# Patient Record
Sex: MALE | Race: OTHER | HISPANIC OR LATINO | URBAN - METROPOLITAN AREA
[De-identification: names, ages, dates, MRNs, and addresses within clinical notes are randomized per-mention and may not be internally consistent; named-entity substitution may affect disease eponyms.]

---

## 2023-07-03 ENCOUNTER — EMERGENCY (EMERGENCY)
Facility: HOSPITAL | Age: 33
LOS: 1 days | Discharge: HOME | End: 2023-07-03
Attending: EMERGENCY MEDICINE | Admitting: EMERGENCY MEDICINE
Payer: OTHER MISCELLANEOUS

## 2023-07-03 VITALS
HEART RATE: 62 BPM | TEMPERATURE: 98 F | SYSTOLIC BLOOD PRESSURE: 112 MMHG | OXYGEN SATURATION: 95 % | RESPIRATION RATE: 18 BRPM | DIASTOLIC BLOOD PRESSURE: 74 MMHG | HEIGHT: 67.72 IN | WEIGHT: 165.35 LBS

## 2023-07-03 VITALS
SYSTOLIC BLOOD PRESSURE: 105 MMHG | HEART RATE: 59 BPM | OXYGEN SATURATION: 100 % | RESPIRATION RATE: 16 BRPM | DIASTOLIC BLOOD PRESSURE: 65 MMHG | TEMPERATURE: 98 F

## 2023-07-03 PROCEDURE — 73140 X-RAY EXAM OF FINGER(S): CPT | Mod: 26,RT

## 2023-07-03 PROCEDURE — 99285 EMERGENCY DEPT VISIT HI MDM: CPT

## 2023-07-03 RX ORDER — IBUPROFEN 200 MG
600 TABLET ORAL ONCE
Refills: 0 | Status: COMPLETED | OUTPATIENT
Start: 2023-07-03 | End: 2023-07-03

## 2023-07-03 RX ORDER — ACETAMINOPHEN 500 MG
2 TABLET ORAL
Qty: 40 | Refills: 0
Start: 2023-07-03 | End: 2023-07-07

## 2023-07-03 RX ORDER — IBUPROFEN 200 MG
1 TABLET ORAL
Qty: 28 | Refills: 0
Start: 2023-07-03 | End: 2023-07-09

## 2023-07-03 RX ORDER — TETANUS TOXOID, REDUCED DIPHTHERIA TOXOID AND ACELLULAR PERTUSSIS VACCINE, ADSORBED 5; 2.5; 8; 8; 2.5 [IU]/.5ML; [IU]/.5ML; UG/.5ML; UG/.5ML; UG/.5ML
0.5 SUSPENSION INTRAMUSCULAR ONCE
Refills: 0 | Status: COMPLETED | OUTPATIENT
Start: 2023-07-03 | End: 2023-07-03

## 2023-07-03 RX ADMIN — Medication 600 MILLIGRAM(S): at 19:22

## 2023-07-03 RX ADMIN — Medication 1 TABLET(S): at 23:10

## 2023-07-03 RX ADMIN — TETANUS TOXOID, REDUCED DIPHTHERIA TOXOID AND ACELLULAR PERTUSSIS VACCINE, ADSORBED 0.5 MILLILITER(S): 5; 2.5; 8; 8; 2.5 SUSPENSION INTRAMUSCULAR at 19:21

## 2023-07-03 NOTE — ED ADULT NURSE NOTE - OBJECTIVE STATEMENT
Pt. with no pmhx presents with laceration to R palm btwn 4th digit. States he's a  and a plate shattered in his hand while at work. Denies blood thinner and no TDAP.

## 2023-07-03 NOTE — ED ADULT NURSE NOTE - NSFALLUNIVINTERV_ED_ALL_ED
Bed/Stretcher in lowest position, wheels locked, appropriate side rails in place/Call bell, personal items and telephone in reach/Instruct patient to call for assistance before getting out of bed/chair/stretcher/Non-slip footwear applied when patient is off stretcher/[place] to call system/Physically safe environment - no spills, clutter or unnecessary equipment/Purposeful proactive rounding/Room/bathroom lighting operational, light cord in reach

## 2023-07-03 NOTE — ED PROVIDER NOTE - CLINICAL SUMMARY MEDICAL DECISION MAKING FREE TEXT BOX
Pt presenting with laceration to R palm. XR imaging ordered. T-dap updated. Wound to be repaired by plastics.

## 2023-07-03 NOTE — ED PROVIDER NOTE - NSFOLLOWUPINSTRUCTIONS_ED_ALL_ED_FT
Cuidado de un desgarro, en adultos  Laceration Care, Adult  Un desgarro es un dara que puede atravesar todas las capas de la piel hasta el tejido que se encuentra debajo de la piel. Algunos desgarros cicatrizan por sí solos. Otros se deben cerrar con puntos (suturas), grapas, tiras adhesivas para la piel o goma para cerrar la piel.    El cuidado adecuado de un desgarro reduce el riesgo de infección, ayuda a que el desgarro cierre mejor, y puede prevenir la formación de cicatrices.    Consejos generales  Mantenga la herida limpia y seca.  No se rasque ni se toque la herida.  Lávese las danyell con agua y jabón colette al menos 20 segundos antes y después de tocar la herida o cambiarse la venda (vendaje). Use desinfectante para danyell si no dispone de agua y jabón.  No usedesinfectantes ni antisépticos, shalom alcohol para frotar, para limpiar la herida, a menos que se lo indique velez médico.  Si le colocaron un vendaje, cámbielo al menos lea vez al día o shalom se lo haya indicado el médico. También debe cambiarlo si se moja o se ensucia.  Cómo cuidar del desgarro  Si se utilizaron suturas o grapas:    Mantenga la herida completamente seca colette las primeras 24 horas o shalom se lo haya indicado el médico. Transcurrido cam tiempo, puede ducharse o mal dorothea de inmersión. No se sumerja en agua hasta que le hayan quitado las suturas o grapas.  Limpie la herida lea vez por día o shalom se lo haya indicado el médico. Para hacer esto:  Lave la herida con agua y jabón.  Enjuáguela con agua para quitar todo el jabón.  Séquela dando palmaditas con lea toalla limpia. No frote la herida.  Después de limpiar la herida, aplique lea delgada capa de ungüento con antibiótico, otros ungüentos tópicos, o un vendaje no adherente shalom se lo haya indicado el médico. [place] ayudará a prevenir infecciones y a evitar que el vendaje se adhiera a la herida.  Sinan que le retiren las suturas o las grapas shalom se lo haya indicado el médico. No retire las suturas ni las grapas usted mismo.  Si se utilizaron tiras adhesivas para la piel:    No deje que las tiras adhesivas para la piel se mojen. Puede bañarse o ducharse, keo tenga cuidado de mantener la herida seca.  Si se moja, séquela dando palmaditas con lea toalla limpia. No frote la herida.  Las tiras adhesivas para la piel se caen solas. Si los bordes de las tiras adhesivas empiezan a despegarse y enroscarse, puede recortar los que estén sueltos. No retire las tiras adhesivas por completo a menos que el médico se lo indique.  Si se utilizó goma para cerrar la piel:    Puede bañarse o ducharse, keo tenga cuidado de mantener la herida seca. No sumerja la herida en agua.  Después de ducharse o darse un baño, seque el dara dando palmaditas con lea toalla limpia. No frote la herida.  No practique actividades que lo sebastian transpirar mucho hasta que la goma para cerrar la piel se haya caído sabrina.  No aplique líquidos, cremas ni ungüentos medicinales en la herida mientras todavía tenga la goma para cerrar la piel. De lo contrario, puede aflojar la película antes de que la herida cicatrice.  Si la herida está cubierta con un vendaje, no aplique cinta adhesiva directamente sobre la goma para cerrar la piel. De lo contrario, puede hacer que la goma se despegue antes de que la herida cicatrice.  No toque la goma. La goma para cerrar la piel generalmente permanece sobre la piel de 5 a 10 días y luego se  sabrina.  Siga estas instrucciones en velez casa:  Medicamentos    Use los medicamentos de venta ambar y los recetados solamente shalom se lo haya indicado el médico.  Si le recetaron un medicamento o ungüento con antibiótico, tómelo o aplíqueselo shalom se lo haya indicado el médico. No deje de usarlo aunque la afección mejore.  Control del dolor y la hinchazón    Si se lo indican, aplique hielo sobre la susu de la lesión. Para hacer esto:  Ponga el hielo en lea bolsa plástica.  Coloque lea toalla entre la piel y la bolsa.  Aplique el hielo colette 20 minutos, 2 o 3 veces por día.  Retire el hielo si la piel se pone de color murry brillante. [place] es muy importante. Si no puede sentir dolor, calor o frío, tiene un mayor riesgo de que se dañe la susu.  Colette las primeras 24 a 48 horas después de que le hayan reparado el desgarro, cuando esté sentado o acostado, levante (eleve) la susu de la lesión por encima del nivel del corazón.  Instrucciones generales    Two wounds closed with skin glue. One is normal. The other is red with pus and infected.  Evite cualquier actividad que pueda hacer que el desgarro vuelva a abrirse.  Controle la herida todos los días para detectar signos de infección. Esté atento a lo siguiente:  Aumento del enrojecimiento, la hinchazón o el dolor.  Líquido o aubrey.  Calor.  Pus o mal olor.  Concurra a todas las visitas de seguimiento. [place] es importante.  Comuníquese con un médico si:  Le aplicaron la vacuna antitetánica y tiene hinchazón, dolor intenso, enrojecimiento o hemorragia en el sitio de la inyección.  La herida cerrada se abre.  Tiene cualquiera de estos signos de infección:  Más enrojecimiento, hinchazón o dolor alrededor de la herida.  Líquido o aubrey que salen de la herida.  Calor que proviene de la herida.  Pus o mal olor proveniente de la herida.  Fiebre.  Nota un cuerpo extraño en la herida, shalom un trozo de dunne o radha.  El dolor no se america con los medicamentos.  Observa que la piel cerca de la herida cambia de color.  Necesita cambiar el vendaje con frecuencia.  Presenta lea nueva erupción cutánea.  Tiene entumecimiento alrededor de la herida.  Solicite ayuda de inmediato si:  Tiene mucha hinchazón alrededor de la herida.  El dolor aumenta repentinamente y es intenso.  Presenta nódulos dolorosos cerca de la herida o en la piel en cualquier susu del cuerpo.  Tiene lea línea luan que sale de la herida.  La herida está en la mano o en el pie, y no puede  correctamente mary de los dedos.  La herida está en la mano o en el pie y observa que los dedos tienen un ct pálido o azulado.  Resumen  Un desgarro es un dara que puede atravesar todas las capas de la piel hasta el tejido que se encuentra debajo de la piel.  Algunos desgarros cicatrizan por sí solos. Otros se deben cerrar con puntos (suturas), grapas, tiras adhesivas para la piel o goma para cerrar la piel.  El cuidado adecuado de un desgarro reduce el riesgo de infección, ayuda a que el desgarro cierre mejor, y puede prevenir la formación de cicatrices.  Esta información no tiene shalom fin reemplazar el consejo del médico. Asegúrese de hacerle al médico cualquier pregunta que tenga.

## 2023-07-03 NOTE — ED ADULT TRIAGE NOTE - CHIEF COMPLAINT QUOTE
Pt walked in c/o of a laceration to the R fourth finger from a plate at work. Wound dressed and bleeding controlled in triage. Tdap utd.

## 2023-07-03 NOTE — ED PROVIDER NOTE - CARE PROVIDER_API CALL
Franky Aguilar  Plastic Surgery  [address], [address]  [place] [address]  Phone: [phone]  Fax: [phone]  Follow Up Time:

## 2023-07-03 NOTE — ED PROVIDER NOTE - PATIENT PORTAL LINK FT
You can access the FollowMyHealth Patient Portal offered by [facility] by registering at the following website: [email or web address]. By joining [employer]’s FollowMyHealth portal, you will also be able to view your health information using other applications (apps) compatible with our system.

## 2023-07-03 NOTE — ED PROVIDER NOTE - PROGRESS NOTE DETAILS
guera: Patient was endorsed to me from Dr. mccarty pending dr Aguilar hand plastics repair.  Patient repaired at bedside.  Requested Augmentin which was sent to his pharmacy.  Also recommended ibuprofen and Tylenol for pain control.  Stable for discharge after final repair

## 2023-07-03 NOTE — ED PROVIDER NOTE - OBJECTIVE STATEMENT
33 y/o M with no PMH presents to the ED for laceration to R palm. Pt works as a  and a plate shattered while at work. He denies numbness, tingling, weakness, or any additional injuries.

## 2023-07-06 DIAGNOSIS — S61.212A LACERATION WITHOUT FOREIGN BODY OF RIGHT MIDDLE FINGER WITHOUT DAMAGE TO NAIL, INITIAL ENCOUNTER: ICD-10-CM

## 2023-07-06 DIAGNOSIS — Z23 ENCOUNTER FOR IMMUNIZATION: ICD-10-CM

## 2023-07-06 DIAGNOSIS — S61.214A LACERATION WITHOUT FOREIGN BODY OF RIGHT RING FINGER WITHOUT DAMAGE TO NAIL, INITIAL ENCOUNTER: ICD-10-CM
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
## 2023-07-06 DIAGNOSIS — S61.411A LACERATION WITHOUT FOREIGN BODY OF RIGHT HAND, INITIAL ENCOUNTER: ICD-10-CM
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
